# Patient Record
Sex: FEMALE | Race: WHITE | NOT HISPANIC OR LATINO | Employment: OTHER | URBAN - METROPOLITAN AREA
[De-identification: names, ages, dates, MRNs, and addresses within clinical notes are randomized per-mention and may not be internally consistent; named-entity substitution may affect disease eponyms.]

---

## 2024-01-30 ENCOUNTER — HOSPITAL ENCOUNTER (EMERGENCY)
Facility: HOSPITAL | Age: 89
Discharge: NURSING, REHAB OR LONG-TERM CARE | End: 2024-01-30
Attending: EMERGENCY MEDICINE | Admitting: EMERGENCY MEDICINE
Payer: MEDICARE

## 2024-01-30 ENCOUNTER — APPOINTMENT (OUTPATIENT)
Dept: CT IMAGING | Facility: HOSPITAL | Age: 89
End: 2024-01-30
Payer: MEDICARE

## 2024-01-30 VITALS
WEIGHT: 109.1 LBS | RESPIRATION RATE: 15 BRPM | HEIGHT: 59 IN | OXYGEN SATURATION: 96 % | BODY MASS INDEX: 22 KG/M2 | DIASTOLIC BLOOD PRESSURE: 78 MMHG | HEART RATE: 67 BPM | TEMPERATURE: 97.8 F | SYSTOLIC BLOOD PRESSURE: 174 MMHG

## 2024-01-30 DIAGNOSIS — S51.811A SKIN TEAR OF RIGHT FOREARM WITHOUT COMPLICATION, INITIAL ENCOUNTER: ICD-10-CM
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
## 2024-01-30 PROCEDURE — 70450 CT HEAD/BRAIN W/O DYE: CPT

## 2024-01-30 PROCEDURE — 99284 EMERGENCY DEPT VISIT MOD MDM: CPT

## 2024-01-30 PROCEDURE — 72125 CT NECK SPINE W/O DYE: CPT

## 2024-01-30 RX ORDER — LEVOTHYROXINE SODIUM 0.05 MG/1
50 TABLET ORAL DAILY
COMMUNITY

## 2024-01-30 RX ORDER — ASPIRIN 81 MG/1
81 TABLET ORAL DAILY
COMMUNITY

## 2024-01-30 RX ORDER — LANOLIN ALCOHOL/MO/W.PET/CERES
1000 CREAM (GRAM) TOPICAL DAILY
COMMUNITY

## 2024-01-30 RX ORDER — SIMVASTATIN 40 MG
40 TABLET ORAL NIGHTLY
COMMUNITY

## 2024-01-30 RX ORDER — MEMANTINE HYDROCHLORIDE 10 MG/1
10 TABLET ORAL 2 TIMES DAILY
COMMUNITY

## 2024-01-30 RX ORDER — UBIDECARENONE 100 MG
100 CAPSULE ORAL DAILY
COMMUNITY

## 2024-01-30 RX ORDER — MELATONIN
1000 DAILY
COMMUNITY

## 2024-01-30 RX ORDER — FERROUS GLUCONATE 324(37.5)
TABLET ORAL
COMMUNITY

## 2024-01-30 RX ORDER — BISACODYL 5 MG/1
5 TABLET, DELAYED RELEASE ORAL DAILY PRN
COMMUNITY

## 2024-01-30 NOTE — ED NOTES
Patient to ED from [facility] following an unwitnessed fall tonight. Patient has skin tear to right forearm. Unknown LOC no blood thinners.

## 2024-01-30 NOTE — ED PROVIDER NOTES
EMERGENCY DEPARTMENT MD ATTESTATION NOTE    SHARED VISIT: This visit was performed by BOTH a physician and an APC. The substantive portion of the medical decision making was performed by this attesting physician who made or approved the management plan and takes responsibility for patient management. All studies documented in the APC note (if performed) were independently interpreted by me.    The JUAN JOSE and I have discussed this patient's history, physical exam, MDM, and treatment plan.  I have reviewed the documentation and personally had a face to face interaction with the patient. The attached note describes my personal findings.        Room Number:  25/25  PCP: Provider, No Known  Independent Historians: EMS    HPI:  A complete HPI/ROS/PMH/PSH/SH/FH are unobtainable due to: Poor historian and Dementia    Chronic or social conditions impacting patient care (Social Determinants of Health): None      Context: Susan Quan is a 89 y.o. female with a medical history of dementia, hypothyroidism and hypercholesterolemia.  Who presents to the ED via EMS from nursing care facility c/o acute right forearm injury due to an unwitnessed fall.  Remainder of history is quite limited because of patient's dementia.  She denies any specific complaints of pain or discomfort to me during my encounter with her.        Review of prior external notes (non-ED) -and- Review of prior external test results outside of this encounter: I reviewed the neurology office progress note from January 17, 2023 when patient had consultation for Alzheimer's        PHYSICAL EXAM    I have reviewed the triage vital signs and nursing notes.    ED Triage Vitals [01/30/24 0425]   Temp Heart Rate Resp BP SpO2   97.8 °F (36.6 °C) 78 15 (!) 168/105 97 %      Temp src Heart Rate Source Patient Position BP Location FiO2 (%)   Oral -- -- -- --       Physical Exam  GENERAL: Elderly, frail-appearing, alert, no acute distress  SKIN: Warm, dry  HENT: Normocephalic,  atraumatic without any sign of craniofacial trauma  EYES: no scleral icterus, EOMI  CV: Normal palpable radial pulses, regular rate  RESPIRATORY: normal effort, lungs clear, no areas of rib tenderness  ABDOMEN: soft, nontender, nondistended  MUSCULOSKELETAL: no deformity evident.  There is a skin tear to the dorsal lateral right forearm but no bony tenderness present.  NEURO: alert, moves all extremities, follows commands      NIH:                                                             MEDICATIONS GIVEN IN ER  Medications - No data to display      ORDERS PLACED DURING THIS VISIT:  Orders Placed This Encounter   Procedures    CT Head Without Contrast    CT Cervical Spine Without Contrast         PROCEDURES  Procedures          PROGRESS, DATA ANALYSIS, CONSULTS, AND MEDICAL DECISION MAKING  All labs have been independently interpreted by me.  All radiology studies have been reviewed by me. All EKG's have been independently viewed and interpreted by me.  Discussion below represents my analysis of pertinent findings related to patient's condition, differential diagnosis, treatment plan and final disposition.    Differential diagnosis includes but is not limited to skin tear, forearm fracture, closed head injury, skull fracture, intracranial bleeding injury.    Clinical Scores:                  ED Course as of 01/30/24 0722   Tue Jan 30, 2024   0441 Patient presents after unwitnessed fall to nursing home.  She is a DNR.  Stable vitals.  She has a skin tear to right forearm.  Plan for limited workup including CT imaging of her head and cervical spine, repair for skin tear. [EE]   0557 CT imaging of the brain independently interpreted myself shows no evidence of acute hemorrhage or fracture. [EE]      ED Course User Index  [EE] Leandro John PA       MDM:   Head CT:I independently interpreted the Head CT w/o Contrast and my findings are: No acute hemorrhage, no midline shift    Cervical spine CT without contrast: I  "independently interpreted the CT cervical spine without contrast today my findings are: Degenerative changes throughout but no acute fracture or subluxation evident      -Having ruled out any intracranial bleed or C-spine fracture, I believe patient is safe to discharge back to her memory care unit for continued supportive care and usual therapies per routine.  Her skin tear wound has been dressed appropriately.  We will communicate the usual \"return to ER\" instructions prior to discharge.  COMPLEXITY OF CARE  Admission was considered but after careful review of the patient's presentation, physical examination, diagnostic results, and response to treatment the patient may be safely discharged with outpatient follow-up.    Please note that portions of this document were completed with a voice recognition program.    Note Disclaimer: At [facility], we believe that sharing information builds trust and better relationships. You are receiving this note because you recently visited [facility]. It is possible you will see health information before a provider has talked with you about it. This kind of information can be easy to misunderstand. To help you fully understand what it means for your health, we urge you to discuss this note with your provider.         Tripp Medina MD  01/30/24 0722    "

## 2024-01-30 NOTE — ED PROVIDER NOTES
EMERGENCY DEPARTMENT ENCOUNTER    Room Number:  25/25  Date of encounter:  1/30/2024  PCP: Provider, No Known  Historian: EMS, patient  Chronic or social conditions impacting care (social determinants of health): DNR from nursing facility    HPI:  Chief Complaint: Fall  A complete HPI/ROS/PMH/PSH/SH/FH are unobtainable due to: Dementia    Context: Susan Quan is a 89 y.o. female with a history of hypothyroidism, hypercholesterolemia, dementia.  She presents to the ED c/o acute injury sustained in a fall prior to arrival.  This was an unwitnessed fall.  Patient is unable to tell me the events of the fall.  She has a skin tear to right forearm however denies any other significant injuries.  She does not take any blood thinners.    Review of prior external notes (non-ED):   I reviewed reviewed and neurology office visit from 8/21/2023.  Patient being followed for Alzheimer's disease.    Review of prior external test results outside of this encounter:  I reviewed a CMP from 12/13/2022.  Creatinine 1.07, potassium 4.2    PAST MEDICAL HISTORY  Active Ambulatory Problems     Diagnosis Date Noted    No Active Ambulatory Problems     Resolved Ambulatory Problems     Diagnosis Date Noted    No Resolved Ambulatory Problems     No Additional Past Medical History         PAST SURGICAL HISTORY  No past surgical history on file.      FAMILY HISTORY  No family history on file.      SOCIAL HISTORY  Social History     Socioeconomic History    Marital status:          ALLERGIES  Fish-derived products, Metformin, Penicillins, and Sulfa antibiotics        REVIEW OF SYSTEMS  Unobtainable secondary to dementia      PHYSICAL EXAM    I have reviewed the triage vital signs and nursing notes.    ED Triage Vitals [01/30/24 0425]   Temp Heart Rate Resp BP SpO2   97.8 °F (36.6 °C) 78 15 (!) 168/105 97 %      Temp src Heart Rate Source Patient Position BP Location FiO2 (%)   Oral -- -- -- --       Physical Exam  GENERAL: Alert, oriented  to self, not distressed  HENT: head atraumatic, no dental or nasal injury.  No cervical tenderness.  EYES: no scleral icterus, EOMI  CV: regular rhythm, regular rate, no murmur  RESPIRATORY: normal effort, CTA  ABDOMEN: soft, nontender  MUSCULOSKELETAL: Small skin tear to the right forearm without bony tenderness.  Full range of motion of all extremities.  NEURO: alert, moves all extremities, follows commands  SKIN: warm, dry        LAB RESULTS  No results found for this or any previous visit (from the past 24 hour(s)).    Ordered the above labs and independently reviewed the results.        RADIOLOGY  CT Cervical Spine Without Contrast    Result Date: 1/30/2024  Patient: HECTOR YOUNG  Time Out: 06:23 Exam(s): CT C SPINE EXAM:   CT Cervical Spine Without Intravenous Contrast CLINICAL HISTORY:    Reason for exam: Fall, head injury. TECHNIQUE:   Axial computed tomography images of the cervical spine without intravenous contrast.  CTDI is 14.8 mGy and DLP is 275.5 mGy-cm.  This CT exam was performed according to the principle of ALARA (As Low As Reasonably Achievable) by using one or more of the following dose reduction techniques: automated exposure control, adjustment of the mA and or kV according to patient size, and or use of iterative reconstruction technique. COMPARISON:   No relevant prior studies available. FINDINGS:   Vertebrae: No evidence of acute fracture.  No subluxation.  Diffuse bone demineralization most likely associated with osteoporosis.   Discs spinal canal neural foramina:  No acute findings.  No spinal canal stenosis.  Moderate multilevel degenerative changes.   Soft tissues:  Unremarkable. IMPRESSION:     There is no evidence of acute fracture or malalignment of the cervical spine. Moderate multilevel degenerative changes.  Bone demineralization likely associated with osteoporosis.     Electronically signed by Dae Guajardo M.D. on 01-30-24 at 0623    CT Head Without Contrast    Result Date:  1/30/2024  Patient: HECTOR YOUNG  Time Out: 06:17 Exam(s): CT HEAD Without Contrast EXAM:   CT Head Without Intravenous Contrast CLINICAL HISTORY:    Reason for exam: Fall, head injury. TECHNIQUE:   Axial computed tomography images of the head brain without intravenous contrast.   CTDI is 53.6 mGy and DLP is 1003.2 mGy-cm.  This CT exam was performed according to the principle of ALARA (As Low As Reasonably Achievable) by using one or more of the following dose reduction techniques: automated exposure control, adjustment of the mA and or kV according to patient size, and or use of iterative reconstruction technique. COMPARISON:   No relevant prior studies available. FINDINGS:   Brain: No evidence of acute intracranial hemorrhage.  No mass effect.  No midline shift.  No abnormal extra-axial fluid collections.  Moderate small vessel ischemic disease.   Ventricles:  Unremarkable.  No ventriculomegaly.   Bones joints:  Unremarkable.  No acute fracture.   Soft tissues:  Unremarkable.   Sinuses:  Unremarkable as visualized.  No acute sinusitis.   Mastoid air cells:  Unremarkable as visualized.  No mastoid effusion. IMPRESSION:     No evidence of acute intracranial abnormality.     Electronically signed by Dae Guajardo M.D. on 01-30-24 at 0617     I ordered the above noted radiological studies. Reviewed by me and discussed with radiologist.  See dictation for official radiology interpretation.      MEDICATIONS GIVEN IN ER    Medications - No data to display      ADDITIONAL ORDERS CONSIDERED BUT NOT ORDERED:  Considered lab evaluation however patient is a DNR with stable vitals.      PROGRESS, DATA ANALYSIS, CONSULTS, AND MEDICAL DECISION MAKING    All labs have been independently interpreted by myself.  All radiology studies have been independently interpreted by myself and discussed with radiologist dictating the report.   EKG's independently interpreted by myself.  Discussion below represents my analysis of pertinent  findings related to patient's condition, differential diagnosis, treatment plan and final disposition.    I have discussed case with Dr. Medina., emergency room physician.  He has performed his own bedside examination and agrees with treatment plan.    ED Course as of 01/30/24 0625 Tue Jan 30, 2024   0441 Patient presents after unwitnessed fall to nursing home.  She is a DNR.  Stable vitals.  She has a skin tear to right forearm.  Plan for limited workup including CT imaging of her head and cervical spine, repair for skin tear. [EE]   0557 CT imaging of the brain independently interpreted myself shows no evidence of acute hemorrhage or fracture. [EE]      ED Course User Index  [EE] Leandro John PA       AS OF 06:25 EST VITALS:    BP - (!) 168/105  HR - 78  TEMP - 97.8 °F (36.6 °C) (Oral)  O2 SATS - 97%        DIAGNOSIS  Final diagnoses:   Fall, initial encounter   Skin tear of right forearm without complication, initial encounter         DISPOSITION  Discharged    Admission was considered but after careful review of the patient's presentation, physical examination, diagnostic results, and response to treatment the patient may be safely discharged with outpatient follow-up.         Dictated utilizing Dragon dictation     Leandro John PA  01/30/24 0625

## 2024-01-30 NOTE — ED NOTES
Pt was able to stand with x 1 assist. Pt able to get into PV with 1 person assist. Pt has all belongings. RN placed two necklaces on pt and earrings were given to pt's daughter in sample cup with pt label on.